# Patient Record
Sex: MALE | Race: WHITE | NOT HISPANIC OR LATINO | Employment: OTHER | URBAN - METROPOLITAN AREA
[De-identification: names, ages, dates, MRNs, and addresses within clinical notes are randomized per-mention and may not be internally consistent; named-entity substitution may affect disease eponyms.]

---

## 2022-10-19 ENCOUNTER — OFFICE VISIT (OUTPATIENT)
Dept: SLEEP MEDICINE | Facility: HOSPITAL | Age: 42
End: 2022-10-19

## 2022-10-19 VITALS
BODY MASS INDEX: 30.21 KG/M2 | DIASTOLIC BLOOD PRESSURE: 84 MMHG | HEIGHT: 70 IN | SYSTOLIC BLOOD PRESSURE: 130 MMHG | WEIGHT: 211 LBS | OXYGEN SATURATION: 96 % | HEART RATE: 74 BPM

## 2022-10-19 DIAGNOSIS — G47.30 OBSERVED SLEEP APNEA: Primary | ICD-10-CM

## 2022-10-19 DIAGNOSIS — G25.81 RESTLESS LEGS SYNDROME (RLS): ICD-10-CM

## 2022-10-19 DIAGNOSIS — G47.8 NON-RESTORATIVE SLEEP: ICD-10-CM

## 2022-10-19 DIAGNOSIS — G47.10 HYPERSOMNIA: ICD-10-CM

## 2022-10-19 DIAGNOSIS — R06.83 SNORING: ICD-10-CM

## 2022-10-19 PROCEDURE — G0463 HOSPITAL OUTPT CLINIC VISIT: HCPCS

## 2022-10-19 PROCEDURE — 99244 OFF/OP CNSLTJ NEW/EST MOD 40: CPT | Performed by: INTERNAL MEDICINE

## 2022-10-19 NOTE — PROGRESS NOTES
[facility] [address] [address]  [place]   [place] [address]  Phone: [phone]  Fax: [phone]      Bowen Muro  [id number]   1980  42 y.o.  male      Referring physician/provider and PCP Alfonzo Tan PA, Ashley Schreiber    Type of service: Initial Sleep Medicine Consult.  Date of service: 10/19/2022      Chief Complaint   Patient presents with   • Witnessed Apnea   • Snoring   • Non-restorative Sleep   • Fatigue   • Dry Mouth   • Restless Legs Syndrome       History of present illness;  Thank you for asking to see Bowen Muro, 42 y.o.. The patient was seen today on 10/19/2022 at [facility].  The patient presents today with symptoms of snoring, non-restorative sleep and witnessed apneas. The symptoms are present for 1 to 2 years and they are persistent in nature.  The snoring is present in all positions and it is loud.  Has no history of prior sleep evaluation and sleep studies. Patient has no prior surgery namely tonsillectomy, nasal surgery and UPPP.     Also gets typical symptoms of restless legs he has urge to move.  And the symptoms gets better with the movement.  No family history of restless leg syndrome.    Patient gives the following sleep history.  Sleep schedule:  Bedtime: 9:30 PM  Wake time: 4:30 AM  Normally takes about 15-20 minutes to fall asleep  Average hours of sleep 6  Number of naps per day 0  Symptoms   In addition to snoring, nonrestorative sleep and witnessed apneas patient gives the following associated symptoms.  Have you ever awakened gasping for breath, coughing, choking: Yes   Change in weight,  No   Morning headaches  Yes   Awaken with a sore throat or dry mouth  Yes   Leg jerking at night:  Yes   Crawly feeling/urge sensation to move in the legs: Yes   Teeth grinding:No   Have you ever awakened at night with a sour taste or burning sensation in your chest:  No   Do you have muscle weakness with laughing or anger or sleep paralysis:  No  "  Have you ever felt paralyzed while going to sleep or waking up:  No   Sleepwalking, nightmares, No   Nocturia (urination at night): 6 times per night  Memory Problem:No     MEDICAL CONDITIONS (PMH)  1. None    Social history:  Do you drive a commercial vehicle:  No   Shift work:  No   Tobacco use:  No   Alcohol use: 1 per week  Caffeinated drinks: 2    Family Hx (your parents and siblings)  1. Sleep apnea, father  2. No history of restless leg syndrome    Medications: reviewed    Review of systems:  Sleep: Positve for snoring,witnessed apnea and daytime excessive sleepiness with [place] Sleepiness Scale of Total score: 13   Kidney/ Bladder  Difficulty In Urination: negative  Bed Wetting: negative  Frequent Urination: negative  HEENT  Recurrent Nose Bleeds: negative  Ear pain: negative  Sores In Mouth: negative  Persistent Hoarseness: negative  Nasal Congestion: negative  Post Nasal Drip: negative  Musculoskeletal:  Neck Pain: negative  Temporomandibular Joint Pain: negative  General:  Fever: negative  Fatigue: positive  Cardiovascular:  Irregular Heartbeat: negative  Swollen Ankles Or Legs: negative  Respiratory:  Shortness Of Breath: negative  Wheezing: negative  Neuro/Paych:  Fainting Spells: negative  Dizziness: negative  Anxiety: negative  Depression: negative  Gastrointestinal:  Problem Swallowing: negative  Frequent Heartburn: negative  Abdominal Bloating: negative  Skin:  Rash: negative  Change In Nails: negative  Endocrine:  Excessive Thirst: negative  Always Too Cold: negative  Always Too Warm: negative  Hem/Lymphatic:  Swollen Glands: negative  Burses/ Bleeds Easily: negative    Physical exam:  CONSTITUTINONAL:  Vitals:    10/19/22 1300   BP: 130/84   Pulse: 74   SpO2: 96%   Weight: 95.7 kg (211 lb)   Height: 177.8 cm (70\")    Body mass index is 30.28 kg/m².   HEAD: atraumatic, normocephalic   EYES:pupils are round, equal and reacting to light and accommodation, conjunctiva normal  NOSE:no nasal septal " defects, nasal passages are clear, no nasal polyps,   THROAT: tonsils are not enlarged, tongue normal size, oral airway Mallampati class 3  NECK:Neck Circumference: 14 inches, trachea is in the midline, thyroid not enlarged  RESPIRATORY SYSTEM: Breath sounds are normal, there are no wheezes  CARDIOVASULAR SYSTEM: Heart sounds are regular rhythm and normal rate, there are no murmurs or thrills, no edema  GASTROINTESTINAL: Soft and non-tender,liver not enlarged, no evidence of ascites  MUSCULOSKELETAL SYSTEM: Full range of motion's in all the 4 extremities, neck movement not restricted, temporomandibular joint movement normal and no tenderness  SKIN: Warm and moist, no cyanosis, no clubbing,  NEUROLOGICAL SYSTEM: Oriented x 3, no gross neurological defects, No speech defect, gait is normal  PSYCHIATRIC SYSTEM: Mood is normal, thought content is normal      Assessment and plan:  · Witnessed apneas,(R06.81) patient's symptoms and examination is consistent with sleep apnea (G47.30). I have talked to the patient about the signs and symptoms of sleep apnea. In addition, I have also discussed pathophysiology of sleep apnea.  I also discussed the complications of untreated sleep apnea including effects on hypertension, diabetes mellitus and nonrestorative sleep with hypersomnia which can increase risk for motor vehicle accidents.  Untreated sleep apnea is also a risk factor for development of atrial fibrillation, pulmonary hypertension and stroke.  Discussed in detail of various testing methods including home-based and lab based sleep studies.  Based on history and physical examination and other comorbidities the most appropriate study is home sleep test.  The order for the sleep study is placed in [facility].  The test will be scheduled after approval from insurance. Treatment and management will be discussed after the test is completed.  Patient was given opportunity to ask questions and all the questions were answered.    · Snoring (R06.83), snoring is the sound created by turbulent airflow vibrating upper airway soft tissue due to limitation of inspiratory airflow. I have also discussed factors affecting snoring including sleep deprivation, sleeping on the back and alcohol ingestion. To minimize snoring, patient is advised to have adequate sleep, sleep on the side and avoid alcohol and sedative medications before bedtime  · Daytime excessive sleepiness .  It was assessed with [place] Sleepiness Scale of Total score: 13.  There are many causes for daytime excessive sleepiness including sleep depression, shiftwork syndrome, depression and other medical disorders including heart, kidney and liver failure.  The most serious cause of excessive sleepiness is due to neurological conditions like narcolepsy/cataplexy.  But the most common cause of excessive sleepiness is due to sleep apnea with frequent awakenings during sleep time.  I have discussed safety of driving and to remain vigilant while driving.  · Obesity 1, patient's BMI is Body mass index is 30.28 kg/m².. I have discussed the relationship between weight and sleep apnea.There is direct correlation between weight and severity of sleep apnea.  Weight reduction is encouraged, as it is going to reduce the severity of sleep apnea. I have also discussed with the patient diet and exercise to achieve ideal body weight.  · Restless leg syndrome, his symptoms are very typical and I have asked him to get iron and ferritin levels checked.    I have also discussed with the patient the following  • Sleep hygiene: Maintaining a regular bedtime and wake time, not to watch television or work in bed, limit caffeine-containing beverages before bed time and avoid naps during the day  • Adequate amount of sleep.  Generally most people needs about 7 to 8 hours of sleep.    Return for 31 to 90 days after PAP setup with down load..  Patient's questions were answered      I once again thank you for  asking me to see this patient in consultation and I have forwarded my opinion and treatment plan.  Please do not hesitate to call me if you have any questions.     Manish Metzger MD  Sleep Medicine  Medical Director, [facility]  10/19/2022 ,

## 2023-03-08 ENCOUNTER — HOSPITAL ENCOUNTER (OUTPATIENT)
Dept: SLEEP MEDICINE | Facility: HOSPITAL | Age: 43
Discharge: HOME | End: 2023-03-08
Admitting: INTERNAL MEDICINE
Payer: OTHER GOVERNMENT

## 2023-03-08 DIAGNOSIS — G25.81 RESTLESS LEGS SYNDROME (RLS): ICD-10-CM

## 2023-03-08 DIAGNOSIS — G47.10 HYPERSOMNIA: ICD-10-CM

## 2023-03-08 DIAGNOSIS — G47.8 NON-RESTORATIVE SLEEP: ICD-10-CM

## 2023-03-08 DIAGNOSIS — R06.83 SNORING: ICD-10-CM

## 2023-03-08 DIAGNOSIS — G47.30 OBSERVED SLEEP APNEA: ICD-10-CM

## 2023-03-08 PROCEDURE — 95806 SLEEP STUDY UNATT&RESP EFFT: CPT | Performed by: INTERNAL MEDICINE

## 2023-03-08 PROCEDURE — 95806 SLEEP STUDY UNATT&RESP EFFT: CPT

## 2023-03-13 DIAGNOSIS — G47.33 OSA (OBSTRUCTIVE SLEEP APNEA): Primary | ICD-10-CM

## 2023-03-13 DIAGNOSIS — R06.83 SNORING: ICD-10-CM

## 2023-03-14 ENCOUNTER — TELEPHONE (OUTPATIENT)
Dept: SLEEP MEDICINE | Facility: HOSPITAL | Age: 43
End: 2023-03-14
Payer: OTHER GOVERNMENT